# Patient Record
Sex: MALE | URBAN - METROPOLITAN AREA
[De-identification: names, ages, dates, MRNs, and addresses within clinical notes are randomized per-mention and may not be internally consistent; named-entity substitution may affect disease eponyms.]

---

## 2019-09-26 ENCOUNTER — APPOINTMENT (OUTPATIENT)
Age: 59
Setting detail: DERMATOLOGY
End: 2019-09-27

## 2019-09-26 DIAGNOSIS — L82.1 OTHER SEBORRHEIC KERATOSIS: ICD-10-CM

## 2019-09-26 PROCEDURE — OTHER ADDITIONAL NOTES: OTHER

## 2019-09-26 PROCEDURE — OTHER OTHER (SELF PAY): OTHER

## 2019-09-26 PROCEDURE — OTHER COUNSELING: OTHER

## 2019-09-26 NOTE — PROCEDURE: OTHER (SELF PAY)
Price (Use Numbers Only, No Special Characters Or $): 50.0
Detail Level: Zone
Other (Free Text): Apply consultation fee towards price of peel when patients returns for treatment

## 2019-09-26 NOTE — PROCEDURE: ADDITIONAL NOTES
Detail Level: Simple
Additional Notes: Recommended 20% TCA chemical peel destruction for optimal results after the wedding in 2 weeks.\\nDiscussed treatment expectations from a chemical peel with expected Side affects and possible down time from public appearance. Boss given $125 for peel. Recommended PCA post care kit for home care.

## 2019-10-10 ENCOUNTER — APPOINTMENT (OUTPATIENT)
Age: 59
Setting detail: DERMATOLOGY
End: 2019-10-12

## 2019-10-10 DIAGNOSIS — L82.1 OTHER SEBORRHEIC KERATOSIS: ICD-10-CM

## 2019-10-10 PROCEDURE — OTHER ADDITIONAL NOTES: OTHER

## 2019-10-10 PROCEDURE — OTHER TCA CHEMICAL PEEL: OTHER

## 2019-10-10 NOTE — PROCEDURE: TCA CHEMICAL PEEL
Chemical Peel: 25% TCA
Consent: Prior to the procedure, written consent was obtained and risks were reviewed, including but not limited to: redness, peeling, blistering, pigmentary change, scarring, infection, and pain.
Price (Use Numbers Only, No Special Characters Or $): 125.0
Detail Level: Zone
Post Peel Care: After the procedure, the treatment area was washed with soap and water, and a post-peel cream was applied. Sun protection and post-care instructions were reviewed with the patient.
Time (Mins): 1
Prep: The treated area was degreased with pre-peel cleanser, and vaseline was applied for protection of mucous membranes.
Frost (0,1+,2+,3+,4+): 1+
Erythema: mild
Post-Care Instructions: I reviewed with the patient in detail post-care instructions. Patient should avoid sun exposure and wear sun protection.
Number Of Coats: 2

## 2019-10-10 NOTE — PROCEDURE: ADDITIONAL NOTES
Detail Level: Simple
Additional Notes: Recommended Cicalfate 4x a day to skin during the peeling process and overall increased emollient for proper healing. Avoid shaving advised for 1-2 weeks to prevent skin infections.